# Patient Record
Sex: MALE | Race: BLACK OR AFRICAN AMERICAN | NOT HISPANIC OR LATINO | Employment: UNEMPLOYED | URBAN - METROPOLITAN AREA
[De-identification: names, ages, dates, MRNs, and addresses within clinical notes are randomized per-mention and may not be internally consistent; named-entity substitution may affect disease eponyms.]

---

## 2020-05-20 ENCOUNTER — HOSPITAL ENCOUNTER (EMERGENCY)
Facility: HOSPITAL | Age: 30
Discharge: HOME | End: 2020-05-20
Attending: EMERGENCY MEDICINE
Payer: MEDICAID

## 2020-05-20 VITALS
OXYGEN SATURATION: 99 % | HEART RATE: 75 BPM | DIASTOLIC BLOOD PRESSURE: 80 MMHG | BODY MASS INDEX: 24.25 KG/M2 | HEIGHT: 68 IN | SYSTOLIC BLOOD PRESSURE: 116 MMHG | WEIGHT: 160 LBS | TEMPERATURE: 99 F | RESPIRATION RATE: 20 BRPM

## 2020-05-20 DIAGNOSIS — S62.352A CLOSED NONDISPLACED FRACTURE OF SHAFT OF THIRD METACARPAL BONE OF RIGHT HAND, INITIAL ENCOUNTER: Primary | ICD-10-CM
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
## 2020-05-20 PROCEDURE — 99284 EMERGENCY DEPT VISIT MOD MDM: CPT | Mod: 25,ER

## 2020-05-20 PROCEDURE — 29125 APPL SHORT ARM SPLINT STATIC: CPT | Mod: RT,ER

## 2020-05-20 PROCEDURE — 96372 THER/PROPH/DIAG INJ SC/IM: CPT | Mod: 59,ER

## 2020-05-20 PROCEDURE — 63600175 PHARM REV CODE 636 W HCPCS: Mod: ER | Performed by: EMERGENCY MEDICINE

## 2020-05-20 RX ORDER — HYDROCODONE BITARTRATE AND ACETAMINOPHEN 5; 325 MG/1; MG/1
1 TABLET ORAL EVERY 4 HOURS PRN
Qty: 12 TABLET | Refills: 0 | Status: SHIPPED | OUTPATIENT
Start: 2020-05-20

## 2020-05-20 RX ORDER — KETOROLAC TROMETHAMINE 30 MG/ML
30 INJECTION, SOLUTION INTRAMUSCULAR; INTRAVENOUS
Status: COMPLETED | OUTPATIENT
Start: 2020-05-20 | End: 2020-05-20

## 2020-05-20 RX ADMIN — KETOROLAC TROMETHAMINE 30 MG: 30 INJECTION, SOLUTION INTRAMUSCULAR at 04:05

## 2020-05-20 NOTE — ED PROVIDER NOTES
"Encounter Date: 5/20/2020       History     Chief Complaint   Patient presents with    Fall     Pt states "I fell off a horse last Wednesday and I hurt my right hand." Edema to right hand. States did not hit head.     Hand Pain     Patient is a 29y AAM presenting with complaint of right hand pain after being thrown off a horse.  He was riding for the first time and the horse bucked him off.  He fell onto the dorsal surface of the right arm.  He did not have head trauma or LOC.  Denies numbness or tingling.  Right hand hurts worse at night.  Has not taken anything for the pain or wrapped it or applied ice.          Review of patient's allergies indicates:  No Known Allergies  History reviewed. No pertinent past medical history.  History reviewed. No pertinent surgical history.  History reviewed. No pertinent family history.  Social History     Tobacco Use    Smoking status: Never Smoker   Substance Use Topics    Alcohol use: Yes     Comment: occ    Drug use: Not on file     Review of Systems   Constitutional: Negative for chills and fever.   Respiratory: Negative for shortness of breath.    Musculoskeletal: Positive for arthralgias.   Skin: Negative for wound.   Psychiatric/Behavioral: Negative for behavioral problems.   All other systems reviewed and are negative.      Physical Exam     Initial Vitals [05/20/20 1507]   BP Pulse Resp Temp SpO2   (!) 142/70 87 20 99 °F (37.2 °C) 97 %      MAP       --         Physical Exam    Nursing note and vitals reviewed.  Constitutional: He appears well-developed and well-nourished.   HENT:   Head: Normocephalic and atraumatic.   Mouth/Throat: Oropharynx is clear and moist.   Eyes: EOM are normal. Pupils are equal, round, and reactive to light.   Neck: Normal range of motion. No JVD present.   Cardiovascular: Normal rate and intact distal pulses.   Pulmonary/Chest: Breath sounds normal. No stridor. No respiratory distress. He has no wheezes. He has no rhonchi. He has no " rales. He exhibits no tenderness.   Abdominal: Soft. He exhibits no distension and no mass. There is no tenderness. There is no rebound and no guarding.   Musculoskeletal: Normal range of motion. He exhibits edema and tenderness.   Right hand diffuse swelling, 2+ radial pulse, <3 sec cap refill, SLIT, tenderness to 3rd MCP, normal  strength   Neurological: He is alert and oriented to person, place, and time. He has normal strength.         ED Course   Orthopedic Injury  Date/Time: 5/20/2020 4:47 PM  Performed by: Shakira Rizo MD  Authorized by: Shakira Rizo MD     Location procedure was performed:  [facility] EMERGENCY DEPARTMENT  Pre-operative diagnosis:  Right 3rd mCP fracture  Injury:     Injury location:  Hand    Location details:  Right hand    Injury type:  Fracture      Pre-procedure assessment:     Neurovascular status: Neurovascularly intact      Distal perfusion: normal      Neurological function: normal      Range of motion: normal      Local anesthesia used?: No      Patient sedated?: No        Selections made in this section will also lock the Injury type section above.:     Immobilization:  Splint    Splint type:  Ulnar gutter    Complications: No      Specimens: No      Implants: No    Post-procedure assessment:     Neurovascular status: Neurovascularly intact      Distal perfusion: normal      Neurological function: normal      Range of motion: normal      Patient tolerance:  Patient tolerated the procedure well with no immediate complications      Labs Reviewed - No data to display       Imaging Results          X-Ray Hand 3 view Right (Final result)  Result time 05/20/20 15:40:08    Final result by Garry Chaparro MD (05/20/20 15:40:08)                 Impression:      Nondisplaced comminuted fracture of the 3rd metacarpal midshaft.  Associated soft tissue swelling.      Electronically signed by: Garry Chaparro  Date:    05/20/2020  Time:    15:40             Narrative:    EXAMINATION:  XR HAND  COMPLETE 3 VIEW RIGHT    CLINICAL HISTORY:  hand pain;.    TECHNIQUE:  PA, lateral, and oblique views of the left hand were performed.    COMPARISON:  None    FINDINGS:  Nondisplaced comminuted fracture of the 3rd metacarpal midshaft.  Associated soft tissue swelling.  No evidence of radiopaque or radiolucent foreign body.  Joint spaces appear well maintained.                                 Medical Decision Making:   ED Management:  This is an emergent evaluation of a 29y AAM presenting with right hand pain after being bucked off a horse.  Exam he is NVI with tenderness to right MCP.  Plain film shows comminuted non displaced fracture of 3rd MCP.  Placed in splint and sling by nurse.  Patient NVI s/p splinting on my repeat exam.  Discharged with referral to ortho, norco and return precautions.                                  Clinical Impression:       ICD-10-CM ICD-9-CM   1. Closed nondisplaced fracture of shaft of third metacarpal bone of right hand, initial encounter S62.352A 815.03   2. Fall from horse, initial encounter V80.010A E828.2             ED Disposition Condition    Discharge Stable        ED Prescriptions     Medication Sig Dispense Start Date End Date Auth. Provider    HYDROcodone-acetaminophen (NORCO) 5-325 mg per tablet Take 1 tablet by mouth every 4 (four) hours as needed for Pain. 12 tablet 5/20/2020  Shakira Rizo MD        Follow-up Information     Follow up With Specialties Details Why Contact Info    [facility]  Schedule an appointment as soon as possible for a visit in 1 day To have your hand fracture evaluated by a bone doctor [address] [place] [address]  [address] [facility] [place]  [place], [place] [id number] [phone]                                     Shakira Rizo MD  05/20/20 7564

## 2021-05-26 ENCOUNTER — HOSPITAL ENCOUNTER (EMERGENCY)
Facility: HOSPITAL | Age: 31
Discharge: HOME | End: 2021-05-26
Attending: EMERGENCY MEDICINE
Payer: MEDICAID

## 2021-05-26 VITALS
SYSTOLIC BLOOD PRESSURE: 144 MMHG | TEMPERATURE: 99 F | OXYGEN SATURATION: 100 % | DIASTOLIC BLOOD PRESSURE: 86 MMHG | WEIGHT: 160 LBS | HEART RATE: 87 BPM | RESPIRATION RATE: 14 BRPM | BODY MASS INDEX: 25.11 KG/M2 | HEIGHT: 67 IN

## 2021-05-26 DIAGNOSIS — Z20.822 COVID-19 VIRUS NOT DETECTED: Primary | ICD-10-CM

## 2021-05-26 LAB — SARS-COV-2 RDRP RESP QL NAA+PROBE: NEGATIVE

## 2021-05-26 PROCEDURE — 99282 EMERGENCY DEPT VISIT SF MDM: CPT | Mod: ER

## 2021-05-26 PROCEDURE — U0002 COVID-19 LAB TEST NON-CDC: HCPCS | Mod: ER | Performed by: EMERGENCY MEDICINE
